# Patient Record
Sex: MALE | URBAN - METROPOLITAN AREA
[De-identification: names, ages, dates, MRNs, and addresses within clinical notes are randomized per-mention and may not be internally consistent; named-entity substitution may affect disease eponyms.]

---

## 2020-12-23 ENCOUNTER — NURSE ONLY (OUTPATIENT)
Dept: PRIMARY CARE CLINIC | Age: 55
End: 2020-12-23

## 2025-01-27 ENCOUNTER — APPOINTMENT (OUTPATIENT)
Dept: ORTHOPEDIC SURGERY | Facility: CLINIC | Age: 60
End: 2025-01-27
Payer: COMMERCIAL

## 2025-01-27 DIAGNOSIS — M54.16 LUMBAR RADICULOPATHY: ICD-10-CM

## 2025-01-27 PROCEDURE — 99203 OFFICE O/P NEW LOW 30 MIN: CPT | Performed by: ORTHOPAEDIC SURGERY

## 2025-01-27 ASSESSMENT — PAIN - FUNCTIONAL ASSESSMENT: PAIN_FUNCTIONAL_ASSESSMENT: NO/DENIES PAIN

## 2025-01-27 NOTE — LETTER
January 27, 2025     Kerri Elizalde, APRN-CNP  [address] [facility] [address]    Patient: Atilio Gomez   YOB: 1965   Date of Visit: 1/27/2025       Dear Dr. Kerri Elizalde, ELZBIETA-CNP:    Thank you for referring Atilio Gomez to me for evaluation. Below are my notes for this consultation.  If you have questions, please do not hesitate to call me. I look forward to following your patient along with you.       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    In August he developed acute and severe back pain.  It lasted for about 2 months.    Currently, he is having no symptoms.    His wife is a physical therapist.  She worked with him and that helped quite a bit.    They brought along an MRI along with the report and they are very concerned about the degenerative changes that are described in the MRI report and the potential for symptoms in the future.    He has been off of work.  He is uncertain whether he is able to return to work.    On exam, moderately obese man no acute distress.  Stable gait.  Painless motion both hips.  Strength intact in the lower extremities.  No pathologic reflexes.    Family, social, and medical histories are obtained and reviewed.    30-point, patient-recorded Review of Systems is personally obtained and reviewed. Inclusive is no history of weight loss, change in appetite, recent change in activity level, change in bowel or bladder habits, fevers, chills, malaise, or night pain.    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  No acute abnormality.    Impression: He had an episode of acute low back pain last summer.  He is asymptomatic at this point.  Given his improvement I would not recommend any aggressive treatment.    I reviewed his MRI personally with he and  his wife.  He does have degenerative changes but I pointed out that in the absence of any significant radicular symptoms I would reassure him.  There is no room for prophylactic surgery.    I would encourage continued exercise and conditioning and weight loss program.    As he does not need to consider surgery, I would plan to see him back on an as-needed basis and encouraged him to continue as noted with the above suggestions for nonsurgical management.    ** Dictated with voice recognition software and not immediately reviewed for errors in grammar and/or spelling **

## 2025-01-28 NOTE — PROGRESS NOTES
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    In August he developed acute and severe back pain.  It lasted for about 2 months.    Currently, he is having no symptoms.    His wife is a physical therapist.  She worked with him and that helped quite a bit.    They brought along an MRI along with the report and they are very concerned about the degenerative changes that are described in the MRI report and the potential for symptoms in the future.    He has been off of work.  He is uncertain whether he is able to return to work.    On exam, moderately obese man no acute distress.  Stable gait.  Painless motion both hips.  Strength intact in the lower extremities.  No pathologic reflexes.    Family, social, and medical histories are obtained and reviewed.    30-point, patient-recorded Review of Systems is personally obtained and reviewed. Inclusive is no history of weight loss, change in appetite, recent change in activity level, change in bowel or bladder habits, fevers, chills, malaise, or night pain.    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  No acute abnormality.    Impression: He had an episode of acute low back pain last summer.  He is asymptomatic at this point.  Given his improvement I would not recommend any aggressive treatment.    I reviewed his MRI personally with he and his wife.  He does have degenerative changes but I pointed out that in the absence of any significant radicular symptoms I would reassure him.  There is no room for prophylactic surgery.    I would encourage continued exercise and conditioning and weight loss program.    As he does not need to consider surgery, I would plan to see him back on an as-needed basis and encouraged him to continue as noted with the above suggestions for nonsurgical management.    ** Dictated with voice recognition software and not immediately reviewed for errors in grammar and/or spelling **

## 2025-02-06 ENCOUNTER — OFFICE VISIT (OUTPATIENT)
Dept: ORTHOPEDIC SURGERY | Facility: CLINIC | Age: 60
End: 2025-02-06
Payer: COMMERCIAL

## 2025-02-06 DIAGNOSIS — M54.50 LUMBAR PAIN: Primary | ICD-10-CM

## 2025-02-06 DIAGNOSIS — M54.16 LUMBAR RADICULOPATHY: ICD-10-CM

## 2025-02-06 PROCEDURE — 99215 OFFICE O/P EST HI 40 MIN: CPT | Performed by: ORTHOPAEDIC SURGERY

## 2025-02-06 NOTE — PROGRESS NOTES
Atilio Gomez is a 59 y.o. 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 Has been having issues since August 2024 (off work) Xrkarson @ [facility] - MRI-Open done @ [facility] 2024. PCP did Flexeril, Steroids, and pain meds. Wife is a Physical Therapist and does at home treatment. ).    HPI:  59-year-old gentleman here for new patient evaluation of low back pain.  He denies any fever chills nausea vomiting night sweats.  He has no bowel or bladder complaints.    Physical exam:  Well-nourished, well kept.No lymphangitis or lymphadenopathy in the examined extremities.  Gait normal.  Can stand on heels and toes.   Examination of the back shows tenderness in the paraspinous musculature in the lumbosacral area just to the right of midline.  There is minimal decreased range of motion in all directions due to guarding/muscle spasms and pain at extremes.  There is good strength and no instability.  Examination of the lower extremities reveals no point tenderness, swelling, or deformity.  Range of motion of the hips, knees, and ankles are full without crepitance, instability, or exacerbation of pain.  Strength is 5/5 throughout.  No redness, abrasions, or lesions on extremities  Gross sensation intact in the extremities.  Deep tendon reflexes 1+ bilateral. Clonus negative.  Affect normal.  Alert and oriented ×3.  Coordination normal.    Imaging studies:  X-rays of the lumbar spine from August 2024 from [facility] were reviewed today.  An open MRI from November 2024 was reviewed today.    Assessment:  59-year-old gentleman here for new patient evaluation of low back pain.  He is not describing any radicular symptoms.  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  He has always had some level of back ache that usually self  resolves without issue, he describes this as being different than he has experienced before.  His wife is a physical therapist and has been working on his back for several months.  Patient states that he does feel better now than he did when this initially happened in August 2024, but he has not been working because of this.  No history of back surgery, no history of injections.  Activity makes it worse, rest makes it better.  His x-rays do not show any scoliosis, spondylolysis or spondylolisthesis.    We have reviewed test today, x-rays, MRI.  The notes from Dr. Rowan from January 27, 2025 were reviewed today.  This talks about his back pain.  He is here with his wife today who is a helpful and necessary historian.  This is an exacerbation of a chronic problem that is affecting his bodily function.    For complete plan and/or surgical details, please refer to Dr. Morton's portion of this split dictation.    -Antoni Navas PA-C    In a face-to-face encounter, I performed a history and physical examination, discussed pertinent diagnostic studies if indicated, and discussed diagnosis and management strategies with both the patient and the midlevel provider.  I reviewed the midlevel's note and agree with the documented findings and plan of care.    Patient with back pain only.  This is been going on for about 5 or 6 months.  He does not have any radicular symptoms.  He has a very low quality MRI done that shows bulging disks at L4-5 centrally into the right and herniated disc at L5-S1 central into the left which is definitely contacting the S1 nerve root.  However, his symptoms are only back pain.  This is a nonsurgical problem.  His wife has been doing physical therapy with him.  It seems like his main concern today is that he has not been able to work since August and he wants to know what he should do about that.  I told him that is up to him.  A last resort option for him would be to get a better quality  MRI, because his prior MRI skipped half of his spine, and we could consider a microdiscectomy on the left at L5-S1 and maybe 1 at L4-5 as well.  However I think he should try pain management chiropractic and formal physical therapy before we went down that road.  He is amenable to that.  We did discuss and consider surgery.  He is severely inhibited bodily function if he is not working.  We will see him back in a as needed basis after he does his physical therapy, chiropractic and pain management.    Garett Morton MD  Orthopedic surgery